# Patient Record
Sex: FEMALE | Race: WHITE | NOT HISPANIC OR LATINO | Employment: OTHER | URBAN - METROPOLITAN AREA
[De-identification: names, ages, dates, MRNs, and addresses within clinical notes are randomized per-mention and may not be internally consistent; named-entity substitution may affect disease eponyms.]

---

## 2023-12-19 ENCOUNTER — PROCEDURE VISIT (OUTPATIENT)
Dept: OBSTETRICS AND GYNECOLOGY | Facility: CLINIC | Age: 78
End: 2023-12-19
Payer: MEDICARE

## 2023-12-19 VITALS
BODY MASS INDEX: 23.52 KG/M2 | WEIGHT: 155.2 LBS | HEIGHT: 68 IN | DIASTOLIC BLOOD PRESSURE: 79 MMHG | SYSTOLIC BLOOD PRESSURE: 120 MMHG

## 2023-12-19 DIAGNOSIS — N81.4 UTERINE PROLAPSE: Primary | ICD-10-CM

## 2023-12-19 PROCEDURE — 99213 OFFICE O/P EST LOW 20 MIN: CPT | Performed by: OBSTETRICS & GYNECOLOGY

## 2023-12-19 RX ORDER — ATORVASTATIN CALCIUM 10 MG/1
10 TABLET, FILM COATED ORAL DAILY
COMMUNITY

## 2023-12-19 NOTE — PROGRESS NOTES
Subjective   Patient ID: Tegan Flores is a 78 y.o. female who presents for Pessary Check (Verified patient by name and date of birth. Patient presents for a pessary check, declines chaperone.).  HPI  Patient presents for pessary check.    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  Patient denies pelvic pain or vaginal bleeding.  Admits to regular bowel movements denies any urinary symptoms.  Review of Systems    Objective   Physical Exam  Pelvic: External genitalia atrophic, vagina pessary removed and cleaned.  Speculum placed in the vagina no vulvar or vaginal lesions noted.  Pessary replaced.  Assessment/Plan   Normal pessary check, patient will follow-up in 3 months or as needed.         Hossein Hamm MD 12/19/23 9:15 AM

## 2024-03-19 ENCOUNTER — PROCEDURE VISIT (OUTPATIENT)
Dept: OBSTETRICS AND GYNECOLOGY | Facility: CLINIC | Age: 79
End: 2024-03-19
Payer: MEDICARE

## 2024-03-19 VITALS
DIASTOLIC BLOOD PRESSURE: 84 MMHG | SYSTOLIC BLOOD PRESSURE: 146 MMHG | BODY MASS INDEX: 22.88 KG/M2 | WEIGHT: 151 LBS | HEIGHT: 68 IN

## 2024-03-19 DIAGNOSIS — Z46.89 ENCOUNTER FOR PESSARY MAINTENANCE: ICD-10-CM

## 2024-03-19 DIAGNOSIS — N81.4 UTERINE PROLAPSE: Primary | ICD-10-CM

## 2024-03-19 PROCEDURE — 99213 OFFICE O/P EST LOW 20 MIN: CPT | Performed by: OBSTETRICS & GYNECOLOGY

## 2024-03-19 NOTE — PROGRESS NOTES
Subjective   Patient ID: Tegan Flores is a 78 y.o. female who presents for Pessary Check (Patient here for pessary check- no c/o/Pt states urinary and bowels are good/Hrt= none/Declined chaperone/).  HPI  Patient presents for pessary check.    Patient with known history of complete uterine prolapse with ring with wet pessary in place.  Patient denies vaginal bleeding, vaginal discharge or pelvic pain.  No issues with urination or bowel movements.  Review of Systems    Objective   Physical Exam  Pelvic: External genitalia atrophic, vagina pessary was removed and cleaned.  Speculum placed in the vagina no vaginal lesions noted.  Pessary replaced.  Assessment/Plan   Normal pessary check, patient will follow-up in 3 months.         Hossein Hamm MD 03/19/24 9:24 AM

## 2024-06-18 ENCOUNTER — APPOINTMENT (OUTPATIENT)
Dept: OBSTETRICS AND GYNECOLOGY | Facility: CLINIC | Age: 79
End: 2024-06-18
Payer: MEDICARE

## 2024-06-18 VITALS
BODY MASS INDEX: 23.3 KG/M2 | WEIGHT: 153.7 LBS | HEIGHT: 68 IN | SYSTOLIC BLOOD PRESSURE: 114 MMHG | DIASTOLIC BLOOD PRESSURE: 71 MMHG

## 2024-06-18 DIAGNOSIS — N81.4 UTERINE PROLAPSE: Primary | ICD-10-CM

## 2024-06-18 PROCEDURE — 1159F MED LIST DOCD IN RCRD: CPT | Performed by: OBSTETRICS & GYNECOLOGY

## 2024-06-18 PROCEDURE — 1160F RVW MEDS BY RX/DR IN RCRD: CPT | Performed by: OBSTETRICS & GYNECOLOGY

## 2024-06-18 PROCEDURE — 1036F TOBACCO NON-USER: CPT | Performed by: OBSTETRICS & GYNECOLOGY

## 2024-06-18 PROCEDURE — 99213 OFFICE O/P EST LOW 20 MIN: CPT | Performed by: OBSTETRICS & GYNECOLOGY

## 2024-06-18 ASSESSMENT — ENCOUNTER SYMPTOMS
LOSS OF SENSATION IN FEET: 0
DEPRESSION: 0
OCCASIONAL FEELINGS OF UNSTEADINESS: 0

## 2024-06-18 NOTE — PROGRESS NOTES
Subjective   Patient ID: Tegan Flores is a 79 y.o. female who presents for Pessary Check (Patient here for pessary check/Pt states urinary and bowels are good/Hrt= none/Declined chaperone).  HPI  Presents for pessary check.    Known history of complete uterine prolapse.  Ring with web pessary in place.  Patient denies vaginal discharge, bleeding or pelvic pain.  No issues with urination or bowel movements.  Review of Systems    Objective   Physical Exam  Pelvic: External genitalia atrophic, vagina pessary was removed and cleaned.  Speculum placed in the vagina no vaginal lesions noted.  Pessary replaced.  Assessment/Plan   Pessary check, patient will follow-up in 3 months.         Hossein Hamm MD 06/18/24 9:49 AM

## 2024-09-17 ENCOUNTER — APPOINTMENT (OUTPATIENT)
Dept: OBSTETRICS AND GYNECOLOGY | Facility: CLINIC | Age: 79
End: 2024-09-17
Payer: MEDICARE

## 2024-09-17 VITALS
HEIGHT: 68 IN | SYSTOLIC BLOOD PRESSURE: 128 MMHG | BODY MASS INDEX: 23.61 KG/M2 | DIASTOLIC BLOOD PRESSURE: 78 MMHG | WEIGHT: 155.8 LBS

## 2024-09-17 DIAGNOSIS — Z46.89 ENCOUNTER FOR PESSARY MAINTENANCE: ICD-10-CM

## 2024-09-17 DIAGNOSIS — N81.4 UTERINE PROLAPSE: Primary | ICD-10-CM

## 2024-09-17 PROCEDURE — 99213 OFFICE O/P EST LOW 20 MIN: CPT | Performed by: OBSTETRICS & GYNECOLOGY

## 2024-09-17 NOTE — PROGRESS NOTES
Subjective   Patient ID: Tegan Flores is a 79 y.o. female who presents for Pessary Check (Patient here for pessary check/Pt states urinary and bowels are good/Declined chaperone).  HPI  Patient presents for pessary check.    Known history of uterine prolapse.  Ring with web pessary in place.  Patient denies vaginal discharge or bleeding.  Denies pelvic pain.  Review of Systems    Objective   Physical Exam  Pelvic: External genitalia atrophic, vagina pessary was removed and cleaned.  Speculum placed in the vagina no vaginal lesions noted.  Pessary replaced.  Assessment/Plan   Normal pessary check, patient will follow-up in 3 months or as needed.         Hossein Hamm MD 09/17/24 9:33 AM

## 2024-12-17 ENCOUNTER — APPOINTMENT (OUTPATIENT)
Dept: OBSTETRICS AND GYNECOLOGY | Facility: CLINIC | Age: 79
End: 2024-12-17
Payer: MEDICARE

## 2024-12-17 VITALS
SYSTOLIC BLOOD PRESSURE: 144 MMHG | BODY MASS INDEX: 23.16 KG/M2 | DIASTOLIC BLOOD PRESSURE: 81 MMHG | HEIGHT: 68 IN | WEIGHT: 152.8 LBS

## 2024-12-17 DIAGNOSIS — Z46.89 ENCOUNTER FOR PESSARY MAINTENANCE: ICD-10-CM

## 2024-12-17 DIAGNOSIS — N81.4 UTERINE PROLAPSE: Primary | ICD-10-CM

## 2024-12-17 PROCEDURE — 99213 OFFICE O/P EST LOW 20 MIN: CPT | Performed by: OBSTETRICS & GYNECOLOGY

## 2024-12-17 NOTE — PROGRESS NOTES
Subjective   Patient ID: Tegan Flores is a 79 y.o. female who presents for Pessary Check (Patient here for pessary check-no c/o/Pt states urinary and bowels are good/Declined chaperone).  HPI  Patient presents for pessary check.    Known history of uterine prolapse with cystorectocele.  Ring with web pessary in place.  Patient denies vaginal bleeding or discharge.  No pelvic pain no issues with urination or bowel movements.  Review of Systems    Objective   Physical Exam  Pelvic: External genitalia atrophic, vagina pessary was removed and cleaned.  Speculum placed in the vagina no vaginal lesions noted.  Pessary replaced.  Assessment/Plan   Normal pessary check, patient will follow-up in 3 months.         Hossein Hamm MD 12/17/24 9:08 AM

## 2025-03-18 ENCOUNTER — APPOINTMENT (OUTPATIENT)
Dept: OBSTETRICS AND GYNECOLOGY | Facility: CLINIC | Age: 80
End: 2025-03-18
Payer: MEDICARE

## 2025-03-18 VITALS
WEIGHT: 152 LBS | SYSTOLIC BLOOD PRESSURE: 144 MMHG | DIASTOLIC BLOOD PRESSURE: 72 MMHG | BODY MASS INDEX: 23.04 KG/M2 | HEIGHT: 68 IN

## 2025-03-18 DIAGNOSIS — N81.10 CYSTOCELE WITH RECTOCELE: ICD-10-CM

## 2025-03-18 DIAGNOSIS — Z46.89 ENCOUNTER FOR PESSARY MAINTENANCE: ICD-10-CM

## 2025-03-18 DIAGNOSIS — N81.4 UTERINE PROLAPSE: Primary | ICD-10-CM

## 2025-03-18 DIAGNOSIS — N81.6 CYSTOCELE WITH RECTOCELE: ICD-10-CM

## 2025-03-18 PROCEDURE — 99213 OFFICE O/P EST LOW 20 MIN: CPT | Performed by: OBSTETRICS & GYNECOLOGY

## 2025-06-24 ENCOUNTER — APPOINTMENT (OUTPATIENT)
Dept: OBSTETRICS AND GYNECOLOGY | Facility: CLINIC | Age: 80
End: 2025-06-24
Payer: MEDICARE

## 2025-06-24 VITALS
BODY MASS INDEX: 23.19 KG/M2 | WEIGHT: 153 LBS | SYSTOLIC BLOOD PRESSURE: 112 MMHG | DIASTOLIC BLOOD PRESSURE: 71 MMHG | HEIGHT: 68 IN

## 2025-06-24 DIAGNOSIS — Z46.89 ENCOUNTER FOR PESSARY MAINTENANCE: ICD-10-CM

## 2025-06-24 DIAGNOSIS — N81.4 UTERINE PROLAPSE: Primary | ICD-10-CM

## 2025-06-24 PROCEDURE — 99213 OFFICE O/P EST LOW 20 MIN: CPT | Performed by: OBSTETRICS & GYNECOLOGY

## 2025-06-24 ASSESSMENT — ENCOUNTER SYMPTOMS
LOSS OF SENSATION IN FEET: 0
OCCASIONAL FEELINGS OF UNSTEADINESS: 0
DEPRESSION: 0

## 2025-09-23 ENCOUNTER — APPOINTMENT (OUTPATIENT)
Dept: OBSTETRICS AND GYNECOLOGY | Facility: CLINIC | Age: 80
End: 2025-09-23
Payer: MEDICARE